# Patient Record
Sex: FEMALE | Race: WHITE | NOT HISPANIC OR LATINO
[De-identification: names, ages, dates, MRNs, and addresses within clinical notes are randomized per-mention and may not be internally consistent; named-entity substitution may affect disease eponyms.]

---

## 2021-05-25 ENCOUNTER — APPOINTMENT (OUTPATIENT)
Dept: NEUROLOGY | Facility: CLINIC | Age: 86
End: 2021-05-25
Payer: MEDICARE

## 2021-05-25 DIAGNOSIS — G45.9 TRANSIENT CEREBRAL ISCHEMIC ATTACK, UNSPECIFIED: ICD-10-CM

## 2021-05-25 DIAGNOSIS — H81.10 BENIGN PAROXYSMAL VERTIGO, UNSPECIFIED EAR: ICD-10-CM

## 2021-05-25 PROBLEM — Z00.00 ENCOUNTER FOR PREVENTIVE HEALTH EXAMINATION: Status: ACTIVE | Noted: 2021-05-25

## 2021-05-25 PROCEDURE — 99072 ADDL SUPL MATRL&STAF TM PHE: CPT

## 2021-05-25 PROCEDURE — 99215 OFFICE O/P EST HI 40 MIN: CPT

## 2021-05-26 PROBLEM — G45.9 TIA (TRANSIENT ISCHEMIC ATTACK): Status: ACTIVE | Noted: 2021-05-26

## 2021-05-26 PROBLEM — H81.10 BENIGN POSITIONAL VERTIGO: Status: ACTIVE | Noted: 2021-05-26

## 2021-05-26 NOTE — DATA REVIEWED
[de-identified] : MRI head 4/28/21:  No evidence of acute intracranial process, specifically no restricted\par diffusion to suggest a recent ischemic event\par \par Evidence of mild microvascular changes\par No enhancing mass detected within the brain parenchyma [de-identified] : cta head and neck 4/28/21:\par No large vessel stenosis or occlusion. \par

## 2021-05-26 NOTE — ASSESSMENT
[FreeTextEntry1] : Pt with transient episode of dizziness and unsteady gait. MRI head neg for stroke, but evidence of old ischemic changes. possible tia vs BPV (had positional vertigo). resolved, with no recurrence\par \par - cont with aspirin 81mg daily\par - can stop plavix 75mg daily\par - cont with pravachol (pt with , but age >75 so no need for high dose statins)\par - event monitoring: pt to follow up with cardiologist\par - followup with me as needed\par - cont with balance rehab as needed. prn meclizine\par \par

## 2021-05-26 NOTE — HISTORY OF PRESENT ILLNESS
[FreeTextEntry1] : Pt seen for hospital follow up\par \par Pt is 84 yo RH F with hx of  history or thyroid, colon and stomach cancer, depression, GERD, who presented with sudden onset of dizziness and gait unsteadiness. On 4/28/21,  She woke up at 4 am and felt dizzy and unsteady. She felt her eyes were moving all over and a tingling in her legs. She has had these symptoms in the past, after GI surgery but symptoms were more severe. She takes daily Plavix. Pt noted with elevated Bp, admitted to [facility] for observation.\par \par Pt with no acute finding on MRI head and cta head and neck.\par \par Pt was discharged on aspirin, plavix and to continue with pravachol\par \par Today, pt is awake and alert, denies any recurrent episodes.\par \par Of note, pt with "tia" spells in the 80's with ams/daze stare with flashing lights in the eyes.\par \par currently no HA, no blurry vision, no nausea.